# Patient Record
(demographics unavailable — no encounter records)

---

## 2024-10-22 NOTE — REASON FOR VISIT
[Post Procedure: _________] : a [unfilled] post procedure visit [Home] : at home, [unfilled] , at the time of the visit. [Medical Office: ([facility])___] : at the medical office located in  [Patient] : the patient [Self] : self

## 2024-10-22 NOTE — REASON FOR VISIT
[Post Procedure: _________] : a [unfilled] post procedure visit [Home] : at home, [unfilled] , at the time of the visit. [Medical Office: ([facility])___] : at the medical office located in  [Patient] : the patient [Self] : self

## 2024-10-22 NOTE — REASON FOR VISIT
[Post Procedure: _________] : a [unfilled] post procedure visit [Home] : at home, [unfilled] , at the time of the visit. [Medical Office: ([facility])___] : at the medical office located in  [Patient] : the patient [Self] : self

## 2024-10-23 NOTE — PHYSICAL EXAM
[Urinary Frequency] : urinary frequency [Pressure] : pressure [Unchanged from prior visit] : unchanged from prior visit [Good] : good [Stable] : stable [No] : no [A & O x 3] : alert and oriented to person, place, and time [Pelvic Pain] : no pelvic pain [Flu Like Symptoms] : no flu like symptoms [Pain] : no complaints of pain [FreeTextEntry6] : 9/10/24 [Fever] : no fever [FreeTextEntry1] : Fibroids [FreeTextEntry2] : 9/10/24

## 2024-10-23 NOTE — HISTORY OF PRESENT ILLNESS
[FreeTextEntry1] : Patient is a 37 years old female with hx of symptomatic uterine fibroids. Symptoms have been persistent for a while but recently got worse. Symptoms include: back pain, pelvic pain, heavy bleeding, clots, urinary frequency  Endometrial bx : Patient aware endometrial biopsy needs to be completed prior to procedure. Gyn: Dr. Childs  Denies any recent SOB, CP, fever, chills, n/v/d.  **patient is seeing fertility on May 1, 2024 to evaluate for future pregnancy **patient stated zepbound has not been approved by insurance as of yet but she is aware the medication would need to be held for 7 days prior to any procedure with anesthesia and she is aware metformin will need to be held the morning of the procedure.  MR Pelvis 3/15/24 "Large anterior intramural fibroid measuring up to 11.5 cm causing mass effect on the bladder. Large 17.3 cm left-sided predominant peritoneal inclusion cyst. Right ovarian cysts measuring up to 3.1 cm, likely physiologic.".   INTERVAL HISTORY 10/16/24 Patient presents today s/p UAE procedure on 9/10/24. Post procedure patient has been feeling well and has since returned to work. She is currently still experiencing symptoms such as cramping occasionally. Patient states she was spotting post procedure until 9/29/24 and on 9/29/24 she started bleeding heavier and believes it was her menstrual cycle.   Patient denies recent fever, chills, shortness of breath, chest pain, nausea, vomiting or diarrhea

## 2024-10-23 NOTE — ASSESSMENT
[FreeTextEntry1] : 38 year old female with symptomatic uterine fibroids status post uterine artery embolization. Patient is recovering well post procedure. She has return of her menstrual cycle with heavy bleeding, explained that this might take time to respond to the embolization and that a small subserosal fibroid is likely the cause.   MRI pelvis ordered. Will determine next steps post MRI and reassessment of clinical symptoms.    MRI abdomen also ordered to re-assess previously drain cyst.

## 2024-11-12 NOTE — ASSESSMENT
[FreeTextEntry1] : Ms. Patterson is a 38 year old female with history of iron deficiency anemia due to severe menorrhagia (fibroid). She had IV iron Feraheme x 2 (12/20 and 12/27 2017) with significant improvement in hemoglobin. Patient was following with Dr. Mejia who has since left the practice. She presented periodically for IV iron supplementation.  Hg mostly replete after initial treatments. Hg 11.7 today, does not appear to require anymore IV iron. S/p uterine fibroid embolization procedure 9/11/24. Still anemic after the procedure.  Patient called to cancel the pre-visit labs as she had labs done on 10/17/24. Will check CBC and iron studies again today to determine if she requires additional IV iron.  Patient seen and examined with Dr. Zavala.   Khurram Stein M.D. Hematology/Oncology Fellow PGY-6 [facility]   I discussed this patient in a pre-clinic session with the fellow including review of clinical status and last labs.  I also saw the patient and discussed history, completed an exam and discussed the plan together with the fellow.  Total time spent today on this patient    37  minutes.  This is a 38 year old woman with history of iron deficiency.  Hg 10.4g/dl.  Patient s/p uterine embolization procedure in December.  Ferritin only 11.  Will need the IV iron repletion after all, its continuing to flal beyond the previous 10.9g/dl and can maintain Hg > 12 with proper iron supplementation.   Will have patient set up for another 3 doses of IV iron.

## 2024-11-12 NOTE — ASSESSMENT
[FreeTextEntry1] : Ms. Patterson is a 38 year old female with history of iron deficiency anemia due to severe menorrhagia (fibroid). She had IV iron Feraheme x 2 (12/20 and 12/27 2017) with significant improvement in hemoglobin. Patient was following with Dr. Mejia who has since left the practice. She presented periodically for IV iron supplementation.  Hg mostly replete after initial treatments. Hg 11.7 today, does not appear to require anymore IV iron. S/p uterine fibroid embolization procedure 9/11/24. Still anemic after the procedure.  Patient called to cancel the pre-visit labs as she had labs done on 10/17/24. Will check CBC and iron studies again today to determine if she requires additional IV iron.  Patient seen and examined with Dr. Zavala.   Khurram Stein M.D. Hematology/Oncology Fellow PGY-6 [facility]   I discussed this patient in a pre-clinic session with the fellow including review of clinical status and last labs.  I also saw the patient and discussed history, completed an exam and discussed the plan together with the fellow.  Total time spent today on this patient    37  minutes.  This is a 38 year old woman with history of iron deficiency.  Hg 10.4g/dl.  Patient s/p uterine embolization procedure in December.  Ferritin only 11.  Will need the IV iron repletion after all, its continuing to flal beyond the previous 10.9g/dl and can maintain Hg > 12 with proper iron supplementation.   Will have patient set up for another 3 doses of IV iron.

## 2024-11-12 NOTE — HISTORY OF PRESENT ILLNESS
[de-identified] : This is a 31 y old female who presents to establish care for iron deficiency anemia. She has been followed by Dr. Willard over the past few months.  The patient reports that she was getting evaluated for weight loss surgery and was found to be anemic. She was referred to Dr. Willard who diagnosed her with iron deficiency. She was intially started on OTC iron 3 times daily but developed a bowel obstruction and she stopped the medication thinking that was the cause. She had an endoscopy which showed no bleeding but deferred colonoscopy. The bowel obstruction was thought to be related to a surgery as a baby she had on her bowel or her weight. She was subsequently started on Focalgin 1 pill daily for her anemia and she was receiving B12 shots with Dr. Willard. She did report an improvement in her Hgb from the 8 range to 10.  She denies any fevers or night sweats. Good appetite. She reports weight gain despite eating the same food as usual. No chest pain or shortness of breath. Energy stable. No frequent infections. She reports that she menstruates every 28 days. Her periods used to be lighter with clots but have become heavier since starting oral iron. They typically last 3-5 days. She was found to have fibroids on recent imaging and has an appointment with a new gynecologist in January. She reports normal bowel movements without any blood in her stool. [de-identified] : Following up for iron deficiency anemia due to menorrhagia from large fibroid. She had uterine fibroid embolization on 9/11/24. Menstruation has not resumed yet. No evidence of bleeding. No lightheadedness, dizziness, shortness of breath, or chest pain.

## 2024-11-12 NOTE — HISTORY OF PRESENT ILLNESS
[de-identified] : This is a 31 y old female who presents to establish care for iron deficiency anemia. She has been followed by Dr. Willard over the past few months.  The patient reports that she was getting evaluated for weight loss surgery and was found to be anemic. She was referred to Dr. Willard who diagnosed her with iron deficiency. She was intially started on OTC iron 3 times daily but developed a bowel obstruction and she stopped the medication thinking that was the cause. She had an endoscopy which showed no bleeding but deferred colonoscopy. The bowel obstruction was thought to be related to a surgery as a baby she had on her bowel or her weight. She was subsequently started on Focalgin 1 pill daily for her anemia and she was receiving B12 shots with Dr. Willard. She did report an improvement in her Hgb from the 8 range to 10.  She denies any fevers or night sweats. Good appetite. She reports weight gain despite eating the same food as usual. No chest pain or shortness of breath. Energy stable. No frequent infections. She reports that she menstruates every 28 days. Her periods used to be lighter with clots but have become heavier since starting oral iron. They typically last 3-5 days. She was found to have fibroids on recent imaging and has an appointment with a new gynecologist in January. She reports normal bowel movements without any blood in her stool. [de-identified] : Following up for iron deficiency anemia due to menorrhagia from large fibroid. She had uterine fibroid embolization on 9/11/24. Menstruation has not resumed yet. No evidence of bleeding. No lightheadedness, dizziness, shortness of breath, or chest pain.

## 2024-11-13 NOTE — HISTORY OF PRESENT ILLNESS
[de-identified] : Patient presents for evaluation of left knee pain and a bump on the medial aspect of her knee that she noticed a month ago.  Pain is 8 out of 10 in severity.  She notices associated clicking.

## 2024-11-13 NOTE — PHYSICAL EXAM
[de-identified] : General Appearance / Station: Well developed, well nourished, in no acute distress  Orientation: Oriented to person, place, and time Gait & Station: Ambulates without assistive device Neurologic: Normal leg sensation  Cardiovascular: Warm extremity  Lymphatics: No lymphedema  Generalized Ligament Laxity: Normal  Stiffness: Normal   LUMBAR SPINE: Nontender  at lumbar spine Straight leg raise: Negative  Motor: 5/5 motor L2-S1 Sensation: Intact  L2-S1  LEFT HIP: Range of motion: Painless  internal and external rotation of the hip. Strength: Within Normal Limits  Palpation: Nontender  at greater trochanter. Nontender  at SI joint Stinchfield: Negative  FADIR: Negative  MARINO: Negative   SYMPTOMATIC LEFT KNEE: Alignment: VARUS  Skin: normal Effusion: none . Quadriceps: normal . Range of motion: symmetrical but painful . PF crepitus: 1+. PF apprehension: none . Patella / Patella Tendon: nontender . Lachman's: negative  Valgus @ 30: negative. Varus @ 30: negative. Posterior drawer: negative. Palpation: TENDER AT MEDIAL JOINT LINE.  Palpable mass medial to the femoral condyle Meniscus signs: MEDIAL JOINT LINE  [de-identified] : Imaging: AP Pelvis show no significant hip joint space narrowing. There are no signs of fracture. The soft tissues appear unremarkable  Imaging: Standing 4 views of the left knee show left knee with no significant. There are no signs of fracture. There is no knee effusion. There is a soft tissue shadow medial to the femoral condyle

## 2024-11-13 NOTE — DISCUSSION/SUMMARY
[de-identified] : BRIAN HERMOSILLO  is an 38 year-old female who presents to the clinic with 1 month of left knee pain and mass.   The patient initially tried OTC medications/NSAIDs with some relief, even though short lasting. Exercise therapy has had only temporary relief.  Radiographic findings show no obvious fracture or deformity,however patient has had continued pain and is unable to return to their usual activities. Given the history along with the physical exam findings of medial joint line pain and pain with deep squat, I am concerned about a possible soft tissue mass  I discussed with them that I often prescribe an anti-inflammatory that should be taken once a day with meals to decrease pain and expedite symptom relief. They should not take this while also taking Aleve (Naprosyn), Motrin/ Advil (Ibuprofen), Toradol (ketoralac). They must stop taking it if they develops stomach pain, increased bleeding or bruising and they should follow-up with their primary care doctor for routine blood work including kidney function to monitor its effect. While it Is not a habit-forming substance, it should only  be taken as needed and to discontinue use once symptoms have resolved.   In the interim, the patient should continue to walk and take anti-inflammatory medications as directed if not medically contraindicated. They will schedule follow-up for in person review of the MRI results. They know to call the office or present to the ER if they develop worsening pain, swelling, numbness, tingling, inability to use the leg.   My cumulative time spent on this patients visit included: Preparation for the visit, review of the medical records, review of pertinent diagnostic studies, examination and counseling of the patient on the above diagnosis, treatment plan and prognosis, orders of diagnostic tests, medications and/or appropriate procedures and documentation in the medical records of todays visit.